# Patient Record
Sex: MALE | Race: WHITE | NOT HISPANIC OR LATINO
[De-identification: names, ages, dates, MRNs, and addresses within clinical notes are randomized per-mention and may not be internally consistent; named-entity substitution may affect disease eponyms.]

---

## 2022-11-15 ENCOUNTER — APPOINTMENT (OUTPATIENT)
Dept: HUMAN REPRODUCTION | Facility: CLINIC | Age: 37
End: 2022-11-15

## 2023-08-30 PROBLEM — Z00.00 ENCOUNTER FOR PREVENTIVE HEALTH EXAMINATION: Status: ACTIVE | Noted: 2023-08-30

## 2024-11-20 ENCOUNTER — NON-APPOINTMENT (OUTPATIENT)
Age: 39
End: 2024-11-20

## 2024-11-20 ENCOUNTER — APPOINTMENT (OUTPATIENT)
Dept: OTOLARYNGOLOGY | Facility: CLINIC | Age: 39
End: 2024-11-20
Payer: COMMERCIAL

## 2024-11-20 VITALS
HEART RATE: 61 BPM | WEIGHT: 210 LBS | BODY MASS INDEX: 30.06 KG/M2 | HEIGHT: 70 IN | SYSTOLIC BLOOD PRESSURE: 116 MMHG | DIASTOLIC BLOOD PRESSURE: 60 MMHG

## 2024-11-20 DIAGNOSIS — Z83.3 FAMILY HISTORY OF DIABETES MELLITUS: ICD-10-CM

## 2024-11-20 DIAGNOSIS — Z78.9 OTHER SPECIFIED HEALTH STATUS: ICD-10-CM

## 2024-11-20 DIAGNOSIS — R04.0 EPISTAXIS: ICD-10-CM

## 2024-11-20 PROCEDURE — 30901 CONTROL OF NOSEBLEED: CPT | Mod: RT

## 2024-11-20 PROCEDURE — 99203 OFFICE O/P NEW LOW 30 MIN: CPT | Mod: 25

## 2024-11-20 RX ORDER — MUPIROCIN 20 MG/G
2 OINTMENT TOPICAL
Qty: 1 | Refills: 5 | Status: ACTIVE | COMMUNITY
Start: 2024-11-20 | End: 1900-01-01

## 2024-12-09 ENCOUNTER — NON-APPOINTMENT (OUTPATIENT)
Age: 39
End: 2024-12-09

## 2024-12-13 ENCOUNTER — APPOINTMENT (OUTPATIENT)
Dept: OTOLARYNGOLOGY | Facility: CLINIC | Age: 39
End: 2024-12-13

## 2025-02-21 ENCOUNTER — RESULT REVIEW (OUTPATIENT)
Age: 40
End: 2025-02-21

## 2025-02-21 ENCOUNTER — EMERGENCY (EMERGENCY)
Facility: HOSPITAL | Age: 40
LOS: 1 days | Discharge: HOME | End: 2025-02-21
Attending: STUDENT IN AN ORGANIZED HEALTH CARE EDUCATION/TRAINING PROGRAM
Payer: COMMERCIAL

## 2025-02-21 ENCOUNTER — APPOINTMENT (OUTPATIENT)
Facility: CLINIC | Age: 40
End: 2025-02-21
Payer: COMMERCIAL

## 2025-02-21 VITALS
DIASTOLIC BLOOD PRESSURE: 58 MMHG | OXYGEN SATURATION: 99 % | SYSTOLIC BLOOD PRESSURE: 114 MMHG | RESPIRATION RATE: 18 BRPM | TEMPERATURE: 98 F | HEART RATE: 77 BPM

## 2025-02-21 VITALS
DIASTOLIC BLOOD PRESSURE: 101 MMHG | HEART RATE: 72 BPM | OXYGEN SATURATION: 99 % | RESPIRATION RATE: 19 BRPM | TEMPERATURE: 98 F | SYSTOLIC BLOOD PRESSURE: 148 MMHG | HEIGHT: 70 IN | WEIGHT: 199.96 LBS

## 2025-02-21 DIAGNOSIS — M25.511 PAIN IN RIGHT SHOULDER: ICD-10-CM

## 2025-02-21 PROCEDURE — 73030 X-RAY EXAM OF SHOULDER: CPT

## 2025-02-21 PROCEDURE — 99204 OFFICE O/P NEW MOD 45 MIN: CPT

## 2025-02-21 PROCEDURE — 73010 X-RAY EXAM OF SHOULDER BLADE: CPT | Mod: RT

## 2025-02-21 PROCEDURE — 73030 X-RAY EXAM OF SHOULDER: CPT | Mod: RT

## 2025-02-21 PROCEDURE — 99284 EMERGENCY DEPT VISIT MOD MDM: CPT | Mod: 25

## 2025-02-21 PROCEDURE — 96372 THER/PROPH/DIAG INJ SC/IM: CPT | Mod: XU

## 2025-02-21 PROCEDURE — 73030 X-RAY EXAM OF SHOULDER: CPT | Mod: 26,RT

## 2025-02-21 PROCEDURE — 99284 EMERGENCY DEPT VISIT MOD MDM: CPT

## 2025-02-21 PROCEDURE — 93005 ELECTROCARDIOGRAM TRACING: CPT

## 2025-02-21 RX ORDER — ACETAMINOPHEN 160 MG/5ML
650 SUSPENSION ORAL ONCE
Refills: 0 | Status: COMPLETED | OUTPATIENT
Start: 2025-02-21 | End: 2025-02-21

## 2025-02-21 RX ORDER — KETOROLAC TROMETHAMINE 10 MG
15 TABLET ORAL ONCE
Refills: 0 | Status: DISCONTINUED | OUTPATIENT
Start: 2025-02-21 | End: 2025-02-21

## 2025-02-21 RX ORDER — DIAZEPAM 5 MG
5 TABLET ORAL ONCE
Refills: 0 | Status: DISCONTINUED | OUTPATIENT
Start: 2025-02-21 | End: 2025-02-21

## 2025-02-21 RX ORDER — MELOXICAM 15 MG/1
15 TABLET ORAL
Qty: 30 | Refills: 2 | Status: ACTIVE | COMMUNITY
Start: 2025-02-21 | End: 1900-01-01

## 2025-02-21 RX ORDER — TRAMADOL HYDROCHLORIDE 50 MG/1
50 TABLET, COATED ORAL 3 TIMES DAILY
Qty: 10 | Refills: 0 | Status: ACTIVE | COMMUNITY
Start: 2025-02-21 | End: 1900-01-01

## 2025-02-21 RX ORDER — DIAZEPAM 5 MG
1 TABLET ORAL
Qty: 4 | Refills: 0
Start: 2025-02-21 | End: 2025-02-22

## 2025-02-21 RX ORDER — LIDOCAINE HYDROCHLORIDE 30 MG/G
1 CREAM TOPICAL ONCE
Refills: 0 | Status: COMPLETED | OUTPATIENT
Start: 2025-02-21 | End: 2025-02-21

## 2025-02-21 RX ADMIN — Medication 15 MILLIGRAM(S): at 03:54

## 2025-02-21 RX ADMIN — Medication 5 MILLIGRAM(S): at 03:52

## 2025-02-21 RX ADMIN — ACETAMINOPHEN 650 MILLIGRAM(S): 160 SUSPENSION ORAL at 03:52

## 2025-02-21 RX ADMIN — LIDOCAINE HYDROCHLORIDE 1 PATCH: 30 CREAM TOPICAL at 03:53

## 2025-02-21 NOTE — ED PROVIDER NOTE - NSFOLLOWUPINSTRUCTIONS_ED_ALL_ED_FT
You have been evaluated in the Emergency Department today for shoulder pain. Your evaluation is concerning for a possible biceps tendon tear. The results of your imaging is included in your discharge instructions.    Please follow up with your primary care physician within two days.    I have notified someone in our system to contact you within the next 2-3 business days to help set up an appointment with ORTHOPEDICS (BONE/JOINT DOCTOR). Please schedule that appointment as soon as you are able to. If you do not hear from them in the next few business days, please call the number listed on the first page of your discharge paperwork.     UNLESS OTHERWISE DIRECTED BY YOUR PRIMARY DOCTOR, for discomfort at home, you can alternate between 600mg ibuprofen (Motrin, Alleve, Advil, etc.) and 650-975mg acetaminophen (Tylenol) every 6-8 hours. If your pain is severe, you can take them both together at the same time. Please do not exceed 3200mg ibuprofen or 4000mg Tylenol in one day. Please consult with your primary doctor before taking any medications on a regular basis.  - If you have a history of KIDNEY DISEASE, BLEEDING PROBLEMS, ACID REFLUX or STOMACH ULCERS, medications such as ibuprofen, Alleve, Motrin, etc. may not be a good choice for you.  - If you have a history of LIVER DISEASE or REGULAR ALCOHOL USE, acetaminophen (Tylenol) may not be a good choice for you.     •Rest as directed and avoid activity that causes pain. You may be able to return to normal activity when you can move without pain. Follow directions for rest and activity. You are at risk for injury for 3 weeks after your symptoms go away.     •Ice your painful muscle area to decrease pain and swelling. Use an ice pack, or put ice in a plastic bag and cover it with a towel. Always put a cloth between the ice and your skin. Apply the ice as often as directed for the first 24 to 48 hours.     •Elevate a painful arm or leg to reduce swelling and pain. Elevate your limb while you are sitting or lying down. Prop a painful leg on pillows to keep it above the level of your heart.    Return to the Emergency Department if you experience numbness, tingling, severe pain, severe swelling, or any other new, worsening or concerning symptoms. You have been evaluated in the Emergency Department today for shoulder pain. Your evaluation is concerning for a possible biceps tendon tear. The results of your imaging is included in your discharge instructions.    Please follow up with your primary care physician within two days.    I have notified someone in our system to contact you within the next 2-3 business days to help set up an appointment with ORTHOPEDICS (BONE/JOINT DOCTOR). Please schedule that appointment as soon as you are able to. If you do not hear from them in the next few business days, please call the number listed on the first page of your discharge paperwork.     UNLESS OTHERWISE DIRECTED BY YOUR PRIMARY DOCTOR, for discomfort at home, you can alternate between 600mg ibuprofen (Motrin, Alleve, Advil, etc.) and 650-975mg acetaminophen (Tylenol) every 6-8 hours. If your pain is severe, you can take them both together at the same time. Please do not exceed 3200mg ibuprofen or 4000mg Tylenol in one day. Please consult with your primary doctor before taking any medications on a regular basis.    You were given a prescription for VALIUM (DIAZEPAM) to be taken for severe muscle spasm. Do not take while driving as can cause drowsiness.   - If you have a history of KIDNEY DISEASE, BLEEDING PROBLEMS, ACID REFLUX or STOMACH ULCERS, medications such as ibuprofen, Alleve, Motrin, etc. may not be a good choice for you.  - If you have a history of LIVER DISEASE or REGULAR ALCOHOL USE, acetaminophen (Tylenol) may not be a good choice for you.     •Rest as directed and avoid activity that causes pain. You may be able to return to normal activity when you can move without pain. Follow directions for rest and activity. You are at risk for injury for 3 weeks after your symptoms go away.     •Ice your painful muscle area to decrease pain and swelling. Use an ice pack, or put ice in a plastic bag and cover it with a towel. Always put a cloth between the ice and your skin. Apply the ice as often as directed for the first 24 to 48 hours.     •Elevate a painful arm or leg to reduce swelling and pain. Elevate your limb while you are sitting or lying down. Prop a painful leg on pillows to keep it above the level of your heart.    Return to the Emergency Department if you experience numbness, tingling, severe pain, severe swelling, or any other new, worsening or concerning symptoms.

## 2025-02-21 NOTE — ED PROVIDER NOTE - CLINICAL SUMMARY MEDICAL DECISION MAKING FREE TEXT BOX
Attending Dr. Porter:  Healthy male presenting with right shoulder pain.  Progressive symptoms onset 5 days prior.  Suspect tendinitis versus ligamentous tear although lower suspicion.  Despite pain with movement patient does not have fever or chills or swelling of the joint to suggest septic joint.  Will obtain x-ray to rule out occult fracture and assess joint space, treat pain and reassess.  Will obtain EKG as well for atypical chest pain although very low suspicion, patient without risk factors and no concerning family history.

## 2025-02-21 NOTE — ED PROVIDER NOTE - OBJECTIVE STATEMENT
Attending Dr. Porter: 39-year-old male with no pertinent past medical history presenting with right shoulder pain for 5 days.  No falls or obvious injuries but did occur after snow tubing with his children.  Progressively worsening since onset and painful with movement.  No chest pain or shortness of breath.  No change with exertion or position, no swelling.

## 2025-02-21 NOTE — ED PROVIDER NOTE - SPECIALTY CARE
Noted
Today's INR 1.7  Goal 2.0-3.0    Per protocol, weekly dose increase by 10%, actual increase 14.3% and repeat INR in 1 week.
noted
Orthopedic Surgery

## 2025-02-21 NOTE — ED PROVIDER NOTE - PROGRESS NOTE DETAILS
Attending Dr. Porter: POCUS performed and does not have obvious joint effusion. But possible hematoma at the insertion site of biceps? raising suspicion for partial bicep tear. Will await XR, pain control and anticipate outpatient ortho f/u for MRI. Attending Dr. Porter: Pain improving. instructed on home care and f/u with ortho. Given sling but instructed to only use for comfort to avoid frozen shoulder. Appears awake and alert and ambulatory post valium. cleared for discharge.

## 2025-02-21 NOTE — ED ADULT TRIAGE NOTE - TEMPERATURE IN CELSIUS (DEGREES C)
Problem: Knowledge Deficit - Standard  Goal: Patient and family/care givers will demonstrate understanding of plan of care, disease process/condition, diagnostic tests and medications  Outcome: Progressing.  Patient verbalized understanding plan of care.         Problem: Diabetes Management  Goal: Patient's ability to maintain appropriate glucose levels will be maintained or improve  Outcome: Progressing  Patient is compliant with medication administration and ACHS finger sticks.           36.5

## 2025-02-21 NOTE — ED PROVIDER NOTE - NSFOLLOWUPCLINICS_GEN_ALL_ED_FT
[facility] Orthopedic Surgery  Orthopedic Surgery  [address], 3rd & 4th [place] [address]  Phone: [phone]  Fax:     [facility]  Orthopedic Surgery  [address] [address]  Phone: [phone]  Fax:

## 2025-02-21 NOTE — ED PROVIDER NOTE - PHYSICAL EXAMINATION
Attending Dr. Porter: Well-appearing, clear lungs, normal heart sounds, right shoulder with anterior tenderness to palpation, also tender along entirety of trapezius, skin is intact with no rash able to range but with pain, radial pulse intact, moist mucous membranes.

## 2025-02-21 NOTE — ED ADULT NURSE NOTE - OBJECTIVE STATEMENT
39y Male no PMH present to ED for L shoulder pain since Sunday, pt states on Saturday he went snow tubing but denies trauma/injury. No physical deformity noted, denies numbness or tingling, States he has been taking Tylenol/ Motrin but no relief, last Tylenol taken 4 hours ago, pain radiates to upper back, denies fever, chest pain, shortness of breath, n/v, 39y Male no PMH present to ED for R shoulder pain since Sunday, pt states on Saturday he went snow tubing but denies trauma/injury. No physical deformity noted, denies numbness or tingling, States he has been taking Tylenol/ Motrin but no relief, last Tylenol taken 4 hours ago, pain radiates to upper back, denies fever, chest pain, shortness of breath, n/v,

## 2025-02-21 NOTE — ED PROVIDER NOTE - PATIENT PORTAL LINK FT
You can access the FollowMyHealth Patient Portal offered by [facility] by registering at the following website: [email or web address]. By joining [employer]’s FollowMyHealth portal, you will also be able to view your health information using other applications (apps) compatible with our system.

## 2025-02-24 LAB
BASOPHILS # BLD AUTO: 0.05 K/UL
BASOPHILS NFR BLD AUTO: 0.6 %
CRP SERPL-MCNC: 13 MG/L
EOSINOPHIL # BLD AUTO: 0.18 K/UL
EOSINOPHIL NFR BLD AUTO: 2.3 %
ERYTHROCYTE [SEDIMENTATION RATE] IN BLOOD BY WESTERGREN METHOD: 14 MM/HR
HCT VFR BLD CALC: 48.3 %
HGB BLD-MCNC: 16.3 G/DL
IMM GRANULOCYTES NFR BLD AUTO: 0.3 %
LYMPHOCYTES # BLD AUTO: 1.63 K/UL
LYMPHOCYTES NFR BLD AUTO: 20.8 %
MAN DIFF?: NORMAL
MCHC RBC-ENTMCNC: 29.3 PG
MCHC RBC-ENTMCNC: 33.7 G/DL
MCV RBC AUTO: 86.7 FL
MONOCYTES # BLD AUTO: 0.7 K/UL
MONOCYTES NFR BLD AUTO: 8.9 %
NEUTROPHILS # BLD AUTO: 5.26 K/UL
NEUTROPHILS NFR BLD AUTO: 67.1 %
PLATELET # BLD AUTO: 178 K/UL
RBC # BLD: 5.57 M/UL
RBC # FLD: 12.6 %
WBC # FLD AUTO: 7.84 K/UL

## 2025-02-25 ENCOUNTER — APPOINTMENT (OUTPATIENT)
Facility: CLINIC | Age: 40
End: 2025-02-25
Payer: COMMERCIAL

## 2025-02-25 VITALS — BODY MASS INDEX: 30.06 KG/M2 | HEIGHT: 70 IN | WEIGHT: 210 LBS

## 2025-02-25 DIAGNOSIS — M65.20 CALCIFIC TENDINITIS, UNSPECIFIED SITE: ICD-10-CM

## 2025-02-25 DIAGNOSIS — S43.439A SUPERIOR GLENOID LABRUM LESION OF UNSPECIFIED SHOULDER, INITIAL ENCOUNTER: ICD-10-CM

## 2025-02-25 PROCEDURE — 99214 OFFICE O/P EST MOD 30 MIN: CPT | Mod: 25

## 2025-02-25 PROCEDURE — 20611 DRAIN/INJ JOINT/BURSA W/US: CPT | Mod: RT

## 2025-02-25 PROCEDURE — J3490M: CUSTOM

## 2025-03-25 ENCOUNTER — APPOINTMENT (OUTPATIENT)
Facility: CLINIC | Age: 40
End: 2025-03-25